# Patient Record
(demographics unavailable — no encounter records)

---

## 2025-02-04 NOTE — PHYSICAL EXAM
[2+] : left foot dorsalis pedis 2+ [Sensation] : the sensory exam was normal to light touch and pinprick [No Focal Deficits] : no focal deficits [Deep Tendon Reflexes (DTR)] : deep tendon reflexes were 2+ and symmetric [Motor Exam] : the motor exam was normal [Ankle Swelling (On Exam)] : not present [] : not present [Varicose Veins Of Lower Extremities] : not present [Delayed in the Right Toes] : capillary refills normal in right toes [Delayed in the Left Toes] : capillary refills normal in the left toes [FreeTextEntry3] : Hair growth noted on digits. Proximal to distal cooling is within normal limits.  [de-identified] : He has swelling at the left bunion. There is some mild swelling. Limited motion. There is pain with end-stage ROM but there is mostly pain at the bump. It looks like this is gout rather than bunion bursitis. [FreeTextEntry1] : Erythema, inflammation and swelling about the left foot. Swelling about the left bunion. There is a scar dorsally that is like a puncture that has a little hypertrophy but not symptomatic. [Diminished Throughout Left Foot] : normal sensation with monofilament testing throughout left foot [Diminished Throughout Right Foot] : normal sensation with monofilament testing throughout right foot

## 2025-02-04 NOTE — ASSESSMENT
[FreeTextEntry1] : Impression: Acute gout, left foot. Bunion.  Treatment: I want him to wear wide shoes. I started him on Colcrys. I gave him a gout diet to follow strictly this week. I expect this should alleviate his symptomatology, otherwise he will follow-up back in the office for evaluation.  I discussed wide shoes to accommodate the bunion.

## 2025-02-04 NOTE — HISTORY OF PRESENT ILLNESS
[FreeTextEntry1] : Patient presents today for evaluation of pain at the bunion. He was but by his dog 6 months ago and has a scar that is not really sensitive. It was painful ever since but recently flared up and was getting worse at the bunion on the left side. It is about a 4/10.

## 2025-02-04 NOTE — PROCEDURE
[FreeTextEntry1] : X-ray Report: (Left foot - 3 views) X-rays demonstrate HAV with bunion. Prominent medial eminence. The bunion is bigger than the radiographs appear but there is prominent medial eminence, deviated sesamoid position, some arthrosis about the 1st MPJ with subchondral cyst.

## 2025-02-04 NOTE — PHYSICAL EXAM
[2+] : left foot dorsalis pedis 2+ [Sensation] : the sensory exam was normal to light touch and pinprick [No Focal Deficits] : no focal deficits [Deep Tendon Reflexes (DTR)] : deep tendon reflexes were 2+ and symmetric [Motor Exam] : the motor exam was normal [Ankle Swelling (On Exam)] : not present [Varicose Veins Of Lower Extremities] : not present [] : not present [Delayed in the Right Toes] : capillary refills normal in right toes [Delayed in the Left Toes] : capillary refills normal in the left toes [FreeTextEntry3] : Hair growth noted on digits. Proximal to distal cooling is within normal limits.  [de-identified] : He has swelling at the left bunion. There is some mild swelling. Limited motion. There is pain with end-stage ROM but there is mostly pain at the bump. It looks like this is gout rather than bunion bursitis. [FreeTextEntry1] : Erythema, inflammation and swelling about the left foot. Swelling about the left bunion. There is a scar dorsally that is like a puncture that has a little hypertrophy but not symptomatic. [Diminished Throughout Right Foot] : normal sensation with monofilament testing throughout right foot [Diminished Throughout Left Foot] : normal sensation with monofilament testing throughout left foot

## 2025-02-04 NOTE — PHYSICAL EXAM
[2+] : left foot dorsalis pedis 2+ [Sensation] : the sensory exam was normal to light touch and pinprick [No Focal Deficits] : no focal deficits [Deep Tendon Reflexes (DTR)] : deep tendon reflexes were 2+ and symmetric [Motor Exam] : the motor exam was normal [Ankle Swelling (On Exam)] : not present [Varicose Veins Of Lower Extremities] : not present [] : not present [Delayed in the Right Toes] : capillary refills normal in right toes [Delayed in the Left Toes] : capillary refills normal in the left toes [FreeTextEntry3] : Hair growth noted on digits. Proximal to distal cooling is within normal limits.  [de-identified] : He has swelling at the left bunion. There is some mild swelling. Limited motion. There is pain with end-stage ROM but there is mostly pain at the bump. It looks like this is gout rather than bunion bursitis. [FreeTextEntry1] : Erythema, inflammation and swelling about the left foot. Swelling about the left bunion. There is a scar dorsally that is like a puncture that has a little hypertrophy but not symptomatic. [Diminished Throughout Left Foot] : normal sensation with monofilament testing throughout left foot [Diminished Throughout Right Foot] : normal sensation with monofilament testing throughout right foot

## 2025-02-28 NOTE — ASSESSMENT
[FreeTextEntry1] : Impression: Pain left foot. Gout. Hallux valgus.  Treatment: Patient consented. Patient was placed in the treatment chair in supine position. After obtaining verbal consent time out was performed to the identified area of maximal tenderness. The left foot was prepped utilizing 70% alcohol solution. After utilizing Ethyl Chloride, a local injection consisting of 1 1/2ccs of a combination of Xylocaine and Kenalog-40 was infiltrated at the 1st MPJ. Injection site was covered with band-aid.  I ordered an arthritic profile, uric acid levels, and referred the patient to rheumatology. I gave him the gout diet to continue to follow. Call the office with issues that persist.

## 2025-02-28 NOTE — HISTORY OF PRESENT ILLNESS
[FreeTextEntry1] : Patient presents today for evaluation and care of pain at the left bunion region. He had previous x-rays that demonstrates no obvious arthritis. He does have a history of arthritis, and psoriasis and has had significant weight loss. I expect this is gout. The pain level is fluctuating. It is about a 4 or 5/10 on evaluation today left foot.

## 2025-02-28 NOTE — PHYSICAL EXAM
[2+] : left foot dorsalis pedis 2+ [Sensation] : the sensory exam was normal to light touch and pinprick [No Focal Deficits] : no focal deficits [Deep Tendon Reflexes (DTR)] : deep tendon reflexes were 2+ and symmetric [Motor Exam] : the motor exam was normal [Ankle Swelling (On Exam)] : not present [Varicose Veins Of Lower Extremities] : not present [] : not present [Delayed in the Right Toes] : capillary refills normal in right toes [Delayed in the Left Toes] : capillary refills normal in the left toes [FreeTextEntry3] : Hair growth noted on digits. Proximal to distal cooling is within normal limits.  [FreeTextEntry1] : There is slight erythema and inflammation about the left bunion. It is quite sensitive with direct palpation at the medial left 1st MPJ, not as painful with ROM. This seems to be gout.  [Diminished Throughout Right Foot] : normal sensation with monofilament testing throughout right foot [Diminished Throughout Left Foot] : normal sensation with monofilament testing throughout left foot